# Patient Record
Sex: FEMALE | Race: WHITE | NOT HISPANIC OR LATINO
[De-identification: names, ages, dates, MRNs, and addresses within clinical notes are randomized per-mention and may not be internally consistent; named-entity substitution may affect disease eponyms.]

---

## 2021-08-12 ENCOUNTER — APPOINTMENT (OUTPATIENT)
Dept: OPHTHALMOLOGY | Facility: CLINIC | Age: 81
End: 2021-08-12
Payer: MEDICARE

## 2021-08-12 ENCOUNTER — NON-APPOINTMENT (OUTPATIENT)
Age: 81
End: 2021-08-12

## 2021-08-12 PROCEDURE — 92014 COMPRE OPH EXAM EST PT 1/>: CPT

## 2021-08-12 PROCEDURE — 92134 CPTRZ OPH DX IMG PST SGM RTA: CPT

## 2021-08-14 ENCOUNTER — TRANSCRIPTION ENCOUNTER (OUTPATIENT)
Age: 81
End: 2021-08-14

## 2021-11-01 ENCOUNTER — TRANSCRIPTION ENCOUNTER (OUTPATIENT)
Age: 81
End: 2021-11-01

## 2021-11-09 PROBLEM — Z00.00 ENCOUNTER FOR PREVENTIVE HEALTH EXAMINATION: Status: ACTIVE | Noted: 2021-11-09

## 2022-05-24 ENCOUNTER — RESULT REVIEW (OUTPATIENT)
Age: 82
End: 2022-05-24

## 2022-08-09 ENCOUNTER — NON-APPOINTMENT (OUTPATIENT)
Age: 82
End: 2022-08-09

## 2022-08-11 ENCOUNTER — NON-APPOINTMENT (OUTPATIENT)
Age: 82
End: 2022-08-11

## 2022-08-11 ENCOUNTER — APPOINTMENT (OUTPATIENT)
Dept: OPHTHALMOLOGY | Facility: CLINIC | Age: 82
End: 2022-08-11

## 2022-08-11 PROCEDURE — 92134 CPTRZ OPH DX IMG PST SGM RTA: CPT

## 2022-08-11 PROCEDURE — 92014 COMPRE OPH EXAM EST PT 1/>: CPT

## 2022-09-27 ENCOUNTER — APPOINTMENT (OUTPATIENT)
Dept: UROGYNECOLOGY | Facility: CLINIC | Age: 82
End: 2022-09-27

## 2022-09-27 PROCEDURE — 81003 URINALYSIS AUTO W/O SCOPE: CPT | Mod: QW

## 2022-09-27 PROCEDURE — 99205 OFFICE O/P NEW HI 60 MIN: CPT

## 2022-09-27 NOTE — ASSESSMENT
[FreeTextEntry1] : Patient is a 82-year-old multipara with nocturia, overactive bladder, mild mixed urinary incontinence (urgency greater than stress) and atrophic vaginitis. On examination, there is no evidence of urethral hypermobility with a negative empty bladder supine cough stress test, normal postvoid residual, and fair levator ani awareness/contraction. She has no history of recurrent UTI. She admits to consumption of mild quantity of bladder irritants. Other potential contributing factors include  intake of anti-hypertensive. \par \par

## 2022-09-27 NOTE — HISTORY OF PRESENT ILLNESS
[FreeTextEntry1] : Patient is a 82 years old Para 2 ( x2) who is referred by her PCP Dr. Castanon for evaluation and management of nocturia\par Patient reports waking up too often to void at night. She wakes up 2-4 times at night and then she is not able to fall asleep again. She also voids frequently during daytime.\par She reports urinary urgency. She had couple of accidents at night when she couldn't get up in time. \par Rare leakage with severe cough only when she has a full bladder\par These symptoms are present for few years but recently gotten worse.\par She saw Dr. Thomas at [facility] 4 years ago due to presence of microscopic hematuria. She had cystoscopy and renal imaging which was normal. Microscopic hematuria resolved since then\par Sensation of incomplte bladder emptying: none\par Denies hx of frequent UTI or kidney stones\par Daily fluid intake: 2 cups of coffee, water\par Takes BP pills in the morning. \par Endorses snoring. \par Denies vaginal bulge symptoms or pressure. Denies vaginal dryness. Taking Anastrazole.  Not sexually active. \par Bowel symptoms: reports hx of decreased bowel mobility- taking Magnesium, and Motegrity which helps, denies fecal incontinence\par \par GYN Hx: LMP . No hx of PMB. Hx of right sided breast cancer- s/p lumpectomy in  then radiation, no chemo. On Anastrazole. Hasn't established GYN care since she moved from [facility] in 2017. \par \par PMHx: HTN , HLD, breast cancer, lung nodules- bronchoscopy\par \par PSHx: right breast lumpectomy, s/p right lung lobectomy in  for small lung nodules- benign

## 2022-09-27 NOTE — DISCUSSION/SUMMARY
[FreeTextEntry1] : The patient was counseled regarding the pathophysiology of the above conditions. A total of approximately 60 minutes was spent on this visit, greater than 50%of which was spent on counseling. She was also counseled regarding the risks, benefits, indications, and alternatives of further evaluations studies, as well as various management options. She was given verbal and written information/education on pelvic floor muscle exercises, avoidance of dietary bladder irritants, and other strategies to improve bladder control. Pharmacologic management of nocturia, overactive bladder and urgency incontinence was discussed.  Advanced treatment options for overactive bladder were also reviewed.  After a detailed discussion, following management plan was outlined:\par 1.  trial of behavioral modification, bladder retraining. she will start the home exercise program as instructed.  She declines referral for supervised physical therapy\par 2. UA with micro and urine culture was ordered to exclude UTI.\par 3. Night time fluid restriction and other strategies to decrease nocturia were reviewed.  Patient endorses snoring.  Discussed association of sleep apnea with nocturia.  Patient declines screening for sleep apnea at this point\par 4.  Patient is very hesitant to consider any treatment options for overactive bladder.  I advised urodynamic testing.  Also discussed trial of Myrbetriq since her blood pressure is well controlled.  She is very hesitant to try any treatment.  She is concerned about interaction with her other medication as well as side effects of constipation.  Explained to her that constipation is not a typical side effect of Myrbetriq.  She may consider\par 5. F/u in 1 month if she starts Myrbetriq.  \par 6.  Discussed use of organic coconut oil for atrophic vaginitis if becomes symptomatic\par 7.  Provided her contact information for Dr. Castillo/Dr. Ibarra to establish routine GYN care

## 2022-09-27 NOTE — PHYSICAL EXAM
[Chaperone Present] : A chaperone was present in the examining room during all aspects of the physical examination [FreeTextEntry1] : General: Not in acute distress, alert and oriented x3.\par Neck: Supple. No lymphadenopathy. \par Abdomen: Soft, nontender, and nondistended. No obvious hepatosplenomegaly. No obvious hernias. A well-healed right lower quadrant scar of previous appendectomy\par Pelvic Exam: Normal external female genitalia. Saddle sensory exam S2 to S4 is intact. Perineal reflexes not visualized. Urethra is well supported.  Small urethral caruncle noted. Cough stress test is negative. Post void residual was checked with I/O cath and was 50 cc of clear urine.  Narrow introitus.  Pale and severely atrophic-appearing vaginal epithelium. No vaginal blood or discharge. Cervix flush with vagina, not adequately visualized. Bimanual exam reveals a small uterus in normal positioning. No adnexal masses or tenderness. Levator ani contraction is 2/5.  All vaginal compartments are well supported\par

## 2022-09-30 LAB — BACTERIA UR CULT: NORMAL

## 2022-10-03 ENCOUNTER — NON-APPOINTMENT (OUTPATIENT)
Age: 82
End: 2022-10-03

## 2022-10-06 LAB
APPEARANCE: CLEAR
BACTERIA: NEGATIVE
BILIRUBIN URINE: NEGATIVE
BLOOD URINE: NEGATIVE
COLOR: YELLOW
GLUCOSE QUALITATIVE U: NEGATIVE
HYALINE CASTS: 0 /LPF
KETONES URINE: NEGATIVE
LEUKOCYTE ESTERASE URINE: NEGATIVE
MICROSCOPIC-UA: NORMAL
NITRITE URINE: NEGATIVE
PH URINE: 7.5
PROTEIN URINE: NEGATIVE
RED BLOOD CELLS URINE: 3 /HPF
SPECIFIC GRAVITY URINE: 1.01
SQUAMOUS EPITHELIAL CELLS: 0 /HPF
UROBILINOGEN URINE: NORMAL
WHITE BLOOD CELLS URINE: 0 /HPF

## 2022-10-31 ENCOUNTER — APPOINTMENT (OUTPATIENT)
Dept: UROGYNECOLOGY | Facility: CLINIC | Age: 82
End: 2022-10-31

## 2022-10-31 VITALS — DIASTOLIC BLOOD PRESSURE: 80 MMHG | SYSTOLIC BLOOD PRESSURE: 132 MMHG

## 2022-10-31 DIAGNOSIS — N32.81 OVERACTIVE BLADDER: ICD-10-CM

## 2022-10-31 PROCEDURE — 99213 OFFICE O/P EST LOW 20 MIN: CPT

## 2022-10-31 NOTE — HISTORY OF PRESENT ILLNESS
[FreeTextEntry1] : Patient is a 82-year-old multipara Who was initially seen on 9/27/2022 for consultation regarding nocturia, overactive bladder, mild mixed urinary incontinence (urgency greater than stress) , microscopic hematuria and atrophic vaginitis. On previous examination, there was no evidence of urethral hypermobility with a negative empty bladder supine cough stress test, normal postvoid residual, and fair levator ani awareness/contraction. She has no history of recurrent UTI.  Other potential contributing factors include intake of anti-hypertensive. \par Patient presents today for a follow-up.  She decided to take Myrbetriq.  She took it for 3 weeks and discontinued due to concerns for side effects.  She states that she was waking up less maybe once at night but had hard time falling back asleep.  She was also concerned about interaction of Myrbetriq with her other medications especially motegrity. \par  Her urine culture from 9/27/2022 returned negative.  Her urinalysis from cath specimen showed 3 RBCs.  She had a negative work-up for  microscopic hematuria by Dr. Richardson in 2018/19. \par \par \par

## 2022-10-31 NOTE — DISCUSSION/SUMMARY
[FreeTextEntry1] : We discussed alternative management options for overactive bladder.  I explained to her that this is a quality-of-life issue and management depends on the degree of her bother.  I have previously recommended urodynamic testing due to presence of mixed urinary incontinence symptoms.  She wants to think about it.  She did review different management options for OAB.  Only option that she might consider is PTNS.  She wants to decide whether she would undergo urodynamic testing before scheduling PTNS sessions.\par We also discussed management options for persistent microscopic hematuria.  She brings with her a log of her urine test.  According to her log, she had couple of urines which were negative for blood since 2018.  I explained to her that the guidelines suggest repeating the work-up in 5 years if she has persistent microscopic hematuria.  She verbalized understanding.\par Follow-up in 3 months.  Approximately 25 minutes were spent in today's encounter.  100% of the time was spent in face-to-face patient counseling, review of lab results etc.

## 2022-10-31 NOTE — ASSESSMENT
[FreeTextEntry1] : Patient is a 82-year-old multipara Who was initially seen on 9/27/2022 for consultation regarding nocturia, overactive bladder, mild mixed urinary incontinence (urgency greater than stress) , microscopic hematuria and atrophic vaginitis.  Patient had a normal postvoid residual.  She is very hesitant to try any treatment.  She reluctantly took Myrbetriq for 3 weeks and then they discontinued it due to concerns of insomnia (which was the initial presenting concern).

## 2022-11-26 ENCOUNTER — NON-APPOINTMENT (OUTPATIENT)
Age: 82
End: 2022-11-26

## 2022-11-30 ENCOUNTER — APPOINTMENT (OUTPATIENT)
Dept: OBGYN | Facility: CLINIC | Age: 82
End: 2022-11-30

## 2022-12-21 ENCOUNTER — APPOINTMENT (OUTPATIENT)
Dept: OBGYN | Facility: CLINIC | Age: 82
End: 2022-12-21

## 2022-12-21 VITALS
DIASTOLIC BLOOD PRESSURE: 72 MMHG | WEIGHT: 124 LBS | BODY MASS INDEX: 20.91 KG/M2 | SYSTOLIC BLOOD PRESSURE: 120 MMHG | HEIGHT: 64.5 IN

## 2022-12-21 DIAGNOSIS — Z80.3 FAMILY HISTORY OF MALIGNANT NEOPLASM OF BREAST: ICD-10-CM

## 2022-12-21 DIAGNOSIS — Z78.9 OTHER SPECIFIED HEALTH STATUS: ICD-10-CM

## 2022-12-21 DIAGNOSIS — Z85.3 PERSONAL HISTORY OF MALIGNANT NEOPLASM OF BREAST: ICD-10-CM

## 2022-12-21 DIAGNOSIS — Z87.891 PERSONAL HISTORY OF NICOTINE DEPENDENCE: ICD-10-CM

## 2022-12-21 DIAGNOSIS — Z01.419 ENCOUNTER FOR GYNECOLOGICAL EXAMINATION (GENERAL) (ROUTINE) W/OUT ABNORMAL FINDINGS: ICD-10-CM

## 2022-12-21 DIAGNOSIS — Z82.49 FAMILY HISTORY OF ISCHEMIC HEART DISEASE AND OTHER DISEASES OF THE CIRCULATORY SYSTEM: ICD-10-CM

## 2022-12-21 DIAGNOSIS — M85.80 OTHER SPECIFIED DISORDERS OF BONE DENSITY AND STRUCTURE, UNSPECIFIED SITE: ICD-10-CM

## 2022-12-21 DIAGNOSIS — I10 ESSENTIAL (PRIMARY) HYPERTENSION: ICD-10-CM

## 2022-12-21 PROCEDURE — G0101: CPT

## 2022-12-21 RX ORDER — LISINOPRIL 10 MG/1
10 TABLET ORAL
Refills: 0 | Status: ACTIVE | COMMUNITY

## 2022-12-21 RX ORDER — AMLODIPINE BESYLATE 5 MG/1
TABLET ORAL
Refills: 0 | Status: ACTIVE | COMMUNITY

## 2022-12-21 RX ORDER — PRAVASTATIN SODIUM 20 MG/1
20 TABLET ORAL
Refills: 0 | Status: ACTIVE | COMMUNITY

## 2022-12-21 RX ORDER — MIRABEGRON 25 MG/1
25 TABLET, FILM COATED, EXTENDED RELEASE ORAL
Qty: 30 | Refills: 2 | Status: DISCONTINUED | COMMUNITY
Start: 2022-09-27 | End: 2022-12-21

## 2022-12-21 NOTE — HISTORY OF PRESENT ILLNESS
[Patient reported mammogram was normal] : Patient reported mammogram was normal [Patient reported PAP Smear was normal] : Patient reported PAP Smear was normal [Patient reported colonoscopy was normal] : Patient reported colonoscopy was normal [postmenopausal] : postmenopausal [N] : Patient is not sexually active [Y] : Positive pregnancy history [Menarche Age: ____] : age at menarche was [unfilled] [Patient reported bone density results were abnormal] : Patient reported bone density results were abnormal [TextBox_4] : 82 year old here for well woman exam. Denies PMB. Denies abdominal pain. Sees Dr. Caro for overactive bladder/nocturia. Believes may have been due to Motegrity, she discontinued 2 weeks ago and states nocturia now only once per night and has noticed improvement.\par \par Hx of lichen sclerosus many years ago, was taking clobetasol many years but symptoms all resolved.  [Mammogramdate] : 2022 [PapSmeardate] : 2018 [BoneDensityDate] : 2022 [TextBox_37] : osteopenia [ColonoscopyDate] : 2020 [LMPDate] : age 54 [PGHxTotal] : 2 [[facility]xLiving] : 2 [FreeTextEntry1] : NSVDx2. +h/o ovarian cysts many years ago, denies fibroids, denies abnormal pap or STI

## 2022-12-21 NOTE — CONSULT LETTER
[Dear  ___] : Dear  [unfilled], [FreeTextEntry1] : I had the pleasure of evaluating your patient, HATTIE KEATING, for her annual exam today. Please see my full note for details. \par \par Thank you for allowing me to participate in the care of this patient. If you have any questions, please do not hesitate to contact me.\par \par Sincerely,\par \par Brittney Castillo MD, FACOG \par [facility]\par Obstetrics and Gynecology in [place]\[address], [facility] [address]\[place] [address]\[facility] Phone: [phone] Fax: [phone]

## 2022-12-21 NOTE — PHYSICAL EXAM
[Chaperone Present] : A chaperone was present in the examining room during all aspects of the physical examination [Appropriately responsive] : appropriately responsive [Alert] : alert [No Acute Distress] : no acute distress [No Lymphadenopathy] : no lymphadenopathy [Soft] : soft [Non-tender] : non-tender [Non-distended] : non-distended [No HSM] : No HSM [No Lesions] : no lesions [No Mass] : no mass [Oriented x3] : oriented x3 [Examination Of The Breasts] : a normal appearance [No Masses] : no breast masses were palpable [Labia Majora] : normal [Labia Minora] : normal [Atrophy] : atrophy [Dry Mucosa] : dry mucosa [Normal] : normal [Uterine Adnexae] : normal [FreeTextEntry1] : ANUSHA Ding [Tenderness] : nontender [Enlarged ___ wks] : not enlarged

## 2022-12-21 NOTE — DISCUSSION/SUMMARY
[FreeTextEntry1] : 83 yo here for well woman exam. \par 1) Health maintenance: \par Pap + HPV today \par Mammogram up to date\par Colonoscopy up to date\par DEXA up to date\par \par 2) Osteopenia:\par Ca/Vit D reviewed\par Weight-bearing exercise reviewed\par \par 3) Hx of breast cancer: \par Continue follow up with Dr. Waldrop as scheduled\par \par 4) Hx of lichen sclerosus:\par No abnormalities on exam today \par No additional treatment needed at this time\par

## 2022-12-22 LAB — HPV HIGH+LOW RISK DNA PNL CVX: NOT DETECTED

## 2023-01-06 LAB — CYTOLOGY CVX/VAG DOC THIN PREP: ABNORMAL

## 2023-01-30 ENCOUNTER — APPOINTMENT (OUTPATIENT)
Dept: UROGYNECOLOGY | Facility: CLINIC | Age: 83
End: 2023-01-30
Payer: MEDICARE

## 2023-01-30 VITALS
HEIGHT: 64 IN | DIASTOLIC BLOOD PRESSURE: 80 MMHG | BODY MASS INDEX: 21.17 KG/M2 | SYSTOLIC BLOOD PRESSURE: 128 MMHG | WEIGHT: 124 LBS

## 2023-01-30 DIAGNOSIS — R39.15 URGENCY OF URINATION: ICD-10-CM

## 2023-01-30 DIAGNOSIS — R35.1 NOCTURIA: ICD-10-CM

## 2023-01-30 DIAGNOSIS — R31.29 OTHER MICROSCOPIC HEMATURIA: ICD-10-CM

## 2023-01-30 PROCEDURE — 99213 OFFICE O/P EST LOW 20 MIN: CPT

## 2023-01-30 NOTE — DISCUSSION/SUMMARY
[FreeTextEntry1] : We again reviewed different managing options for nocturia including a trial a Amitriptiline, PTNS, SNM, Botox etc. Also recommended considering urodynamic testing. She is not interested in pursuing any options at this point. I recommended following up as needed\par Recommended repeating cystoscopy next year for hx of microscopic hematuria. She agreed\par She has a history of breast cancer and stopped taking Arimidex in October 2022\par Approximately 20 minutes spent in today's encounter.  100% of the time was spent in face-to-face patient counseling

## 2023-01-30 NOTE — HISTORY OF PRESENT ILLNESS
[FreeTextEntry1] : Patient is a 82-year-old multipara Who was initially seen on 9/27/2022 for consultation regarding nocturia, overactive bladder, mild mixed urinary incontinence (urgency greater than stress) , microscopic hematuria and atrophic vaginitis. Patient had a normal postvoid residual. She is very hesitant to try any treatment. She reluctantly took Myrbetriq for 3 weeks and then they discontinued it due to concerns of insomnia (which was the initial presenting concern).  She showed interest in PTNS on last visit . She states that’s since she stopped taking Motegrity, she is no longer waking up as much at night . She wakes up now 2 times at night. She is happy with her progress and doesn't want to pursue any treatments at this time. \par \par

## 2023-02-13 ENCOUNTER — NON-APPOINTMENT (OUTPATIENT)
Age: 83
End: 2023-02-13

## 2023-02-13 ENCOUNTER — APPOINTMENT (OUTPATIENT)
Dept: OPHTHALMOLOGY | Facility: CLINIC | Age: 83
End: 2023-02-13
Payer: MEDICARE

## 2023-02-13 PROCEDURE — 92014 COMPRE OPH EXAM EST PT 1/>: CPT

## 2023-02-13 PROCEDURE — 92250 FUNDUS PHOTOGRAPHY W/I&R: CPT

## 2023-06-26 ENCOUNTER — APPOINTMENT (OUTPATIENT)
Dept: PHYSICAL MEDICINE AND REHAB | Facility: CLINIC | Age: 83
End: 2023-06-26
Payer: MEDICARE

## 2023-06-26 PROCEDURE — 99205 OFFICE O/P NEW HI 60 MIN: CPT

## 2023-06-26 RX ORDER — MELOXICAM 15 MG/1
15 TABLET ORAL DAILY
Qty: 30 | Refills: 0 | Status: ACTIVE | COMMUNITY
Start: 2023-06-26 | End: 1900-01-01

## 2023-06-26 RX ORDER — CYCLOBENZAPRINE HYDROCHLORIDE 5 MG/1
5 TABLET, FILM COATED ORAL 3 TIMES DAILY
Qty: 90 | Refills: 0 | Status: ACTIVE | COMMUNITY
Start: 2023-06-26 | End: 1900-01-01

## 2023-06-26 NOTE — ASSESSMENT
[FreeTextEntry1] : Mobic 15 mg po qd #30 with meal \par Flexeril 5 mg po TID #90 \par Continue with medical massages 2x weekly/x4 weeks. \par HEP reviewed with pt. \par Recheck in 2 weeks.  If symptoms persist, initiate trial of TPis 1x weekly/x6 weeks. Goals of which are to decrease pain, dissipate muscle spasm, increase ROM and improve level of function.\par

## 2023-06-26 NOTE — HISTORY OF PRESENT ILLNESS
[FreeTextEntry1] : Pt is an 82 year old woman who presents today for evaluation of neck pain. Pt reports waking up with C/S pain in February 2023. Pt followed-up with PCP and a CT of her C/S was obtained. Ms. Kim was then evaluated by [facility]. Xray of the C/S was obtained. Baclofen and a course of PT were prescribed. She states PT provided temporary relief and was not able to tolerate Baclofen. Pt reports she has been receiving therapeutic massage to her C/S which she is finding beneficial. However, pt continues to complain of neck pain with limited ROM and assocaited muscle spasm. Denies radicular symptoms at the current time. \par Pt was referred to my office today for evaluation and treatment involving her C/S complaints.

## 2023-06-26 NOTE — PHYSICAL EXAM
[FreeTextEntry1] : EXAMINATION OF THE CERVICAL SPINE AND UPPER EXTREMITIES: \par \par Cranial nerve testing was intact.  Smell and taste were not tested. \par Visual fields were full.  \par There was no difficulty with finger to nose response.  Pt is able to preform rapid alternating movements of digits of the hands bilaterally. \par Romberg testing was negative.  \par There was no dysmetria of the upper extremities. \par Reflexes revealed 2 and symmetrical throughout \par No gross atrophy    \par MMT U/E: intact \par Sensory examination revealed sensation was intact.\par Vibratory and proprioceptive testing were intact.  \par Peripheral pulses were palpable bilaterally.  Parks Test was negative bilaterally.  Tinels Test was negative at the wrists bilaterally.  \par The Spurling Cervical Compression Test was negative.  The Adson’s Maneuver was negative bilaterally.  No scapular winging was noted.  There was good scapular mobility.  \par \par Range of motion testing was performed with the use of a goniometer.  \par On range of motion testing, \par Flexion was to 40º (normal - 45º)\par Extension was to 20º (normal - 55º)\par Right rotation was to 45º (normal - 70º)\par Left rotation was to 50º (normal - 70º)\par Right lateral bending was to 25º (normal - 40º)\par Left lateral bending was to 20º (normal - 40º)\par \par On palpation, of the cervical spine there was tenderness and spasm involving the bilateral cervical paraspinal musculature with greater involvement on the right. Tenderness involving C5/C6 spinous processes. Tenderness involving bilateral C5/C6 cervical facet joints with greater involvement on the right. Trigger points bilateral trapezii and levator scapular musculature with greater involvement on the right.   \par \par \par

## 2023-06-26 NOTE — CONSULT LETTER
[Dear  ___] : Dear  [unfilled], [Consult Letter:] : I had the pleasure of evaluating your patient, [unfilled]. [( Thank you for referring [unfilled] for consultation for _____ )] : Thank you for referring [unfilled] for consultation for [unfilled] [Please see my note below.] : Please see my note below. [Consult Closing:] : Thank you very much for allowing me to participate in the care of this patient.  If you have any questions, please do not hesitate to contact me. [Sincerely,] : Sincerely, [FreeTextEntry3] : Julito Suarez MD\par

## 2023-07-19 ENCOUNTER — APPOINTMENT (OUTPATIENT)
Dept: PHYSICAL MEDICINE AND REHAB | Facility: CLINIC | Age: 83
End: 2023-07-19
Payer: MEDICARE

## 2023-07-19 PROCEDURE — 99213 OFFICE O/P EST LOW 20 MIN: CPT

## 2023-07-19 NOTE — HISTORY OF PRESENT ILLNESS
[FreeTextEntry1] : Patient reports some improvement with medical massage to the cervical spine.  Reports increased range of motion with diminished muscle spasm.  However continues to complain of neck pain primarily left-sided.  Denies radicular symptoms at the current time.  Patient reports she was unable to tolerate Mobic or Flexeril reports experiencing constipation with medication.  DC'd after 1 week.

## 2023-07-19 NOTE — ASSESSMENT
[FreeTextEntry1] : Initiate trigger point injections to the cervical spine \par 1xweek for 6 weeks \par Goals of which are to decrease pain, dissipate muscle spasm, increase ROM and improve level of function.\par HEP reviewed with pt. \par Recheck in 2 weeks.

## 2023-07-19 NOTE — PHYSICAL EXAM
[FreeTextEntry1] : EXAMINATION OF THE CERVICAL SPINE AND UPPER EXTREMITIES: \par \par \par Reflexes revealed 2 and symmetrical throughout \par No gross atrophy    \par MMT U/E: intact \par Sensory examination revealed sensation was intact.\par The Spurling Cervical Compression Test was negative. \par \par Range of motion testing was performed with the use of a goniometer.  \par On range of motion testing, \par Flexion was to 45º (normal - 45º)\par Extension was to 20º (normal - 55º)\par Right rotation was to 50º (normal - 70º)\par Left rotation was to 50º (normal - 70º)\par Right lateral bending was to 28º (normal - 40º)\par Left lateral bending was to 20º (normal - 40º)\par \par On palpation, . Tenderness involving bilateral C5/C6 cervical facet joints with greater involvement on the left. Trigger points bilateral trapezii and levator scapular musculature with greater involvement on the left   \par \par \par

## 2023-08-02 ENCOUNTER — APPOINTMENT (OUTPATIENT)
Dept: PHYSICAL MEDICINE AND REHAB | Facility: CLINIC | Age: 83
End: 2023-08-02
Payer: MEDICARE

## 2023-08-02 PROCEDURE — 99213 OFFICE O/P EST LOW 20 MIN: CPT | Mod: 25

## 2023-08-02 PROCEDURE — 20553 NJX 1/MLT TRIGGER POINTS 3/>: CPT

## 2023-08-02 PROCEDURE — J3490M: CUSTOM | Mod: NC

## 2023-08-02 NOTE — PROCEDURE
[de-identified] : Pt presents today for initiation of TPIs to C/S.  EXAMINATION OF THE CERVICAL SPINE AND UPPER EXTREMITIES:    Reflexes revealed 2 and symmetrical throughout      MMT U/E: intact   On range of motion testing,  Flexion was to 45 (normal - 45) Extension was to 20 (normal - 55) Right rotation was to 55 (normal - 70) Left rotation was to 50 (normal - 70) Right lateral bending was to 25 (normal - 40) Left lateral bending was to 20 (normal - 40)  On palpation, . . Trigger points bilateral trapezii and levator scapular musculature with greater involvement on the left     Sterile Technique Injection  2 Syringes of 5 cc 1 % Lidocaine HCL   Left Trapezius Left levator scapular  Left supraspinatus    Ice injection site PRN  Injection tolerated well.

## 2023-08-09 ENCOUNTER — APPOINTMENT (OUTPATIENT)
Dept: PHYSICAL MEDICINE AND REHAB | Facility: CLINIC | Age: 83
End: 2023-08-09
Payer: MEDICARE

## 2023-08-09 PROCEDURE — J3490M: CUSTOM | Mod: NC

## 2023-08-09 PROCEDURE — 20553 NJX 1/MLT TRIGGER POINTS 3/>: CPT

## 2023-08-09 PROCEDURE — 99213 OFFICE O/P EST LOW 20 MIN: CPT | Mod: 25

## 2023-08-09 NOTE — PROCEDURE
[de-identified] : Pt presents today for initiation of TPIs to C/S.  EXAMINATION OF THE CERVICAL SPINE AND UPPER EXTREMITIES:    Reflexes revealed 2 and symmetrical throughout      MMT U/E: intact   On range of motion testing,  Flexion was to 45 (normal - 45) Extension was to 20 (normal - 55) Right rotation was to 55 (normal - 70) Left rotation was to 50 (normal - 70) Right lateral bending was to 25 (normal - 40) Left lateral bending was to 20 (normal - 40)  On palpation, . . Trigger points bilateral trapezii and levator scapular musculature with greater involvement on the left     Sterile Technique Injection  2 Syringes of 5 cc 1 % Lidocaine HCL   Left Trapezius Left levator scapular  Left supraspinatus    Ice injection site PRN  Injection tolerated well.

## 2023-08-16 ENCOUNTER — APPOINTMENT (OUTPATIENT)
Dept: PHYSICAL MEDICINE AND REHAB | Facility: CLINIC | Age: 83
End: 2023-08-16

## 2023-08-23 ENCOUNTER — APPOINTMENT (OUTPATIENT)
Dept: PHYSICAL MEDICINE AND REHAB | Facility: CLINIC | Age: 83
End: 2023-08-23
Payer: MEDICARE

## 2023-08-23 PROCEDURE — 20553 NJX 1/MLT TRIGGER POINTS 3/>: CPT

## 2023-08-23 PROCEDURE — 99213 OFFICE O/P EST LOW 20 MIN: CPT | Mod: 25

## 2023-08-23 NOTE — PROCEDURE
[de-identified] : Patient reports improvement involving her left trapezius region.  Pt presents in my office today for c/o left sided neck pain   EXAMINATION OF THE CERVICAL SPINE AND UPPER EXTREMITIES:    Reflexes revealed 2 and symmetrical throughout.      MMT U/E: intact   On range of motion testing,  Flexion was to 45 degrees (normal - 45) Extension was to 20 degrees (normal - 55) Right rotation was to 55 degrees (normal - 70) Left rotation was to 50 degrees (normal - 70) Right lateral bending was to 25 (normal - 40) Left lateral bending was to 20 (normal - 40)  On palpation, . . Trigger points bilateral trapezii and levator scapular musculature with greater involvement on the left     Sterile Technique Injection  2 Syringes of 5 cc 1 % Lidocaine HCL   Left C4,C5, and C6 paraspinal musculature.    Ice injection site PRN  Injection tolerated well.

## 2023-08-24 ENCOUNTER — APPOINTMENT (OUTPATIENT)
Dept: OPHTHALMOLOGY | Facility: CLINIC | Age: 83
End: 2023-08-24
Payer: MEDICARE

## 2023-08-24 ENCOUNTER — NON-APPOINTMENT (OUTPATIENT)
Age: 83
End: 2023-08-24

## 2023-08-24 PROCEDURE — 99214 OFFICE O/P EST MOD 30 MIN: CPT

## 2023-08-30 ENCOUNTER — APPOINTMENT (OUTPATIENT)
Dept: PHYSICAL MEDICINE AND REHAB | Facility: CLINIC | Age: 83
End: 2023-08-30
Payer: MEDICARE

## 2023-08-30 PROCEDURE — 99214 OFFICE O/P EST MOD 30 MIN: CPT

## 2023-08-30 NOTE — PHYSICAL EXAM
[FreeTextEntry1] : EXAMINATION OF CERVICAL SPINE:   Flexion:  45 degrees Extension:  20 degrees Lateral Bend: R: 25 degrees                        L:  20 degrees  Rotation: R: 60 degrees                  L:   50 degrees  Palpation cervical spine: decrease tenderness and spasm left trapezius and tenderness involving the left C5/C6 cervical facet region. MMT U/E: intact    [Normal] : Heart rate was normal and rhythm regular, normal S1 and S2, no gallops, no murmurs and no pericardial rub

## 2023-08-30 NOTE — ASSESSMENT
[FreeTextEntry1] : PM consultation - I believe patient would benefit from left cervical facet injections.  HEP reviewed with patient  Recheck in 6 weeks

## 2023-08-30 NOTE — HISTORY OF PRESENT ILLNESS
[FreeTextEntry1] : Pt continues with c/o neck pain with intermittent radiation of pain involving the left trapezius does note decreased pain and spasm involving the left trapezius. she continues with left sided cervical spine pain which is aggravated with rotation. Denies radicular symptoms involving her bilateral upper extremities.

## 2023-09-06 ENCOUNTER — APPOINTMENT (OUTPATIENT)
Dept: PHYSICAL MEDICINE AND REHAB | Facility: CLINIC | Age: 83
End: 2023-09-06

## 2023-09-13 ENCOUNTER — APPOINTMENT (OUTPATIENT)
Dept: PHYSICAL MEDICINE AND REHAB | Facility: CLINIC | Age: 83
End: 2023-09-13

## 2023-09-20 ENCOUNTER — APPOINTMENT (OUTPATIENT)
Dept: PHYSICAL MEDICINE AND REHAB | Facility: CLINIC | Age: 83
End: 2023-09-20

## 2023-09-28 ENCOUNTER — APPOINTMENT (OUTPATIENT)
Dept: PAIN MANAGEMENT | Facility: CLINIC | Age: 83
End: 2023-09-28

## 2023-11-08 ENCOUNTER — APPOINTMENT (OUTPATIENT)
Dept: PHYSICAL MEDICINE AND REHAB | Facility: CLINIC | Age: 83
End: 2023-11-08
Payer: MEDICARE

## 2023-11-08 DIAGNOSIS — M47.812 SPONDYLOSIS W/OUT MYELOPATHY OR RADICULOPATHY, CERVICAL REGION: ICD-10-CM

## 2023-11-08 DIAGNOSIS — M62.838 OTHER MUSCLE SPASM: ICD-10-CM

## 2023-11-08 DIAGNOSIS — M50.90 CERVICAL DISC DISORDER, UNSPECIFIED, UNSPECIFIED CERVICAL REGION: ICD-10-CM

## 2023-11-08 DIAGNOSIS — M54.2 CERVICALGIA: ICD-10-CM

## 2023-11-08 PROCEDURE — 99213 OFFICE O/P EST LOW 20 MIN: CPT

## 2024-06-09 ENCOUNTER — NON-APPOINTMENT (OUTPATIENT)
Age: 84
End: 2024-06-09

## 2024-08-05 ENCOUNTER — NON-APPOINTMENT (OUTPATIENT)
Age: 84
End: 2024-08-05

## 2024-08-08 ENCOUNTER — NON-APPOINTMENT (OUTPATIENT)
Age: 84
End: 2024-08-08

## 2024-08-08 ENCOUNTER — APPOINTMENT (OUTPATIENT)
Dept: OPHTHALMOLOGY | Facility: CLINIC | Age: 84
End: 2024-08-08

## 2024-08-08 PROCEDURE — 92134 CPTRZ OPH DX IMG PST SGM RTA: CPT

## 2024-08-08 PROCEDURE — 92014 COMPRE OPH EXAM EST PT 1/>: CPT

## 2025-04-21 ENCOUNTER — NON-APPOINTMENT (OUTPATIENT)
Age: 85
End: 2025-04-21

## 2025-04-21 ENCOUNTER — APPOINTMENT (OUTPATIENT)
Dept: OPHTHALMOLOGY | Facility: CLINIC | Age: 85
End: 2025-04-21
Payer: MEDICARE

## 2025-04-21 PROCEDURE — 92012 INTRM OPH EXAM EST PATIENT: CPT

## 2025-04-28 ENCOUNTER — NON-APPOINTMENT (OUTPATIENT)
Age: 85
End: 2025-04-28

## 2025-04-30 ENCOUNTER — APPOINTMENT (OUTPATIENT)
Dept: OPHTHALMOLOGY | Facility: CLINIC | Age: 85
End: 2025-04-30
Payer: MEDICARE

## 2025-04-30 ENCOUNTER — NON-APPOINTMENT (OUTPATIENT)
Age: 85
End: 2025-04-30

## 2025-04-30 PROCEDURE — 99213 OFFICE O/P EST LOW 20 MIN: CPT

## 2025-07-31 ENCOUNTER — APPOINTMENT (OUTPATIENT)
Dept: OPHTHALMOLOGY | Facility: CLINIC | Age: 85
End: 2025-07-31
Payer: MEDICARE

## 2025-07-31 ENCOUNTER — NON-APPOINTMENT (OUTPATIENT)
Age: 85
End: 2025-07-31

## 2025-07-31 PROCEDURE — 92134 CPTRZ OPH DX IMG PST SGM RTA: CPT

## 2025-07-31 PROCEDURE — 92014 COMPRE OPH EXAM EST PT 1/>: CPT
